# Patient Record
Sex: MALE | URBAN - METROPOLITAN AREA
[De-identification: names, ages, dates, MRNs, and addresses within clinical notes are randomized per-mention and may not be internally consistent; named-entity substitution may affect disease eponyms.]

---

## 2022-09-20 ENCOUNTER — TELEPHONE ENCOUNTER (OUTPATIENT)
Dept: URBAN - METROPOLITAN AREA CLINIC 37 | Facility: CLINIC | Age: 82
End: 2022-09-20

## 2022-09-21 ENCOUNTER — TELEPHONE ENCOUNTER (OUTPATIENT)
Dept: URBAN - METROPOLITAN AREA SURGERY CENTER 8 | Facility: SURGERY CENTER | Age: 82
End: 2022-09-21

## 2022-09-22 ENCOUNTER — OFFICE VISIT (OUTPATIENT)
Dept: URBAN - METROPOLITAN AREA CLINIC 37 | Facility: CLINIC | Age: 82
End: 2022-09-22
Payer: COMMERCIAL

## 2022-09-22 ENCOUNTER — TELEPHONE ENCOUNTER (OUTPATIENT)
Dept: URBAN - METROPOLITAN AREA CLINIC 35 | Facility: CLINIC | Age: 82
End: 2022-09-22

## 2022-09-22 ENCOUNTER — LAB OUTSIDE AN ENCOUNTER (OUTPATIENT)
Dept: URBAN - METROPOLITAN AREA CLINIC 37 | Facility: CLINIC | Age: 82
End: 2022-09-22

## 2022-09-22 VITALS
WEIGHT: 164.6 LBS | DIASTOLIC BLOOD PRESSURE: 70 MMHG | HEIGHT: 65 IN | HEART RATE: 102 BPM | SYSTOLIC BLOOD PRESSURE: 122 MMHG | BODY MASS INDEX: 27.42 KG/M2 | OXYGEN SATURATION: 94 %

## 2022-09-22 DIAGNOSIS — R13.19 ESOPHAGEAL DYSPHAGIA: ICD-10-CM

## 2022-09-22 DIAGNOSIS — C15.9 PRIMARY ESOPHAGEAL SQUAMOUS CELL CARCINOMA: ICD-10-CM

## 2022-09-22 DIAGNOSIS — E44.0 MODERATE PROTEIN-CALORIE MALNUTRITION: ICD-10-CM

## 2022-09-22 PROCEDURE — 99204 OFFICE O/P NEW MOD 45 MIN: CPT | Performed by: INTERNAL MEDICINE

## 2022-09-22 RX ORDER — SERTRALINE 50 MG/1
1 TABLET TABLET, FILM COATED ORAL ONCE A DAY
Qty: 30 | Status: ACTIVE | COMMUNITY
Start: 2022-09-22

## 2022-09-22 RX ORDER — CALCIUM CARBONATE 500(1250)
1 TABLET WITH MEALS TABLET ORAL TWICE A DAY
Qty: 60 | Status: ACTIVE | COMMUNITY
Start: 2022-09-22

## 2022-09-22 RX ORDER — CODEINE PHOSPHATE AND GUAIFENESIN 10; 100 MG/5ML; MG/5ML
TAKE 5 ML BY MOUTH EVERY FOUR TO SIX HOURS AS NEEDED SOLUTION ORAL
Qty: 160 UNSPECIFIED | Refills: 0 | Status: ACTIVE | COMMUNITY

## 2022-09-22 RX ORDER — ZOLPIDEM TARTRATE 5 MG/1
TAKE ONE TABLET BY MOUTH AT BEDTIME ON NIGHT OF SLEEP STUDY DURING ELECTRODE APPLICATION; CAN REPEAT A SECOND DOSE 40 MINUTES LATER TABLET, COATED ORAL
Qty: 30 UNSPECIFIED | Refills: 0 | Status: ACTIVE | COMMUNITY

## 2022-09-22 RX ORDER — LEVOTHYROXINE SODIUM 50 UG/1
1 TABLET IN THE MORNING ON AN EMPTY STOMACH TABLET ORAL ONCE A DAY
Qty: 30 | Status: ACTIVE | COMMUNITY
Start: 2022-09-22

## 2022-09-22 RX ORDER — OMEPRAZOLE 40 MG/1
CAPSULE, DELAYED RELEASE ORAL
Qty: 90 CAPSULE | Status: ACTIVE | COMMUNITY

## 2022-09-22 RX ORDER — ATORVASTATIN CALCIUM 10 MG/1
TAKE ONE TABLET BY MOUTH ONE TIME DAILY TABLET, FILM COATED ORAL
Qty: 90 UNSPECIFIED | Refills: 0 | Status: ACTIVE | COMMUNITY

## 2022-09-22 RX ORDER — ASCORBIC ACID 1000 MG
1 TABLET TABLET ORAL ONCE A DAY
Qty: 30 | Status: ACTIVE | COMMUNITY
Start: 2022-09-22

## 2022-09-22 RX ORDER — LACTOSE-REDUCED FOOD/FIBER 0.07 G-1.5
300 ML LIQUID (ML) ORAL
Qty: 27000 MILLILITER | Refills: 11 | OUTPATIENT
Start: 2022-09-22

## 2022-09-22 NOTE — HPI-DYSPHAGIA
Patient is here for an initial consultation due to dysphagia. Patient had a history of proximal esophageal and esophagogastric junction cancer, stage II, which was diagnosed after EGD was performed by Dr. Elena Diaz in 12/16/2020 . EGD revealed a circumferential tumor 19 cm  from the incisors. 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 Patient underwent chemo (weekly Carbo/Taxol) and RT with Dr. Brewer completed: C6 of chemo on 03/04/2021 and RT completed on 03/18/2021.  Repeat EGD don with Dr. Diaz on 07/12/2021 was negative for residual disease but proximal esophageal stricture was found. Repeat EGD done 03/10/2022 showed esophageal strictures, dilated and bxx showed recurrence of refractory SCC at 15 cm from the incisors.  Then had weekly Carbo/Taxol x 5 week and completed RT in 05/31/2022.  Patient has a PET CT done 09/15/2022, which showed interval enlargement and increasingly hypermetabolic mass of the proximal esophagus suggesting disease progression/refractory SCC.  Mass may reflect paraesophageal metastatic lymph node or mucosal lesion of the esophagus. Dr. Pieter Lopes (Onc) spoke to me yesterday and request a palliative peg-tube placement for nutrition and meds  Patient presents here today with daughter, per daughter, patient could not eat any solid and has difficulty drinking liquid, which caused choking and regurgitation while swallowing, so he was only able to swallow very slowly only with liquid. Denies pain when swallowing, abdominal pain, fever, nausea or vomiting

## 2022-09-30 ENCOUNTER — CLAIMS CREATED FROM THE CLAIM WINDOW (OUTPATIENT)
Dept: URBAN - METROPOLITAN AREA CLINIC 4 | Facility: CLINIC | Age: 82
End: 2022-09-30
Payer: COMMERCIAL

## 2022-09-30 ENCOUNTER — OFFICE VISIT (OUTPATIENT)
Dept: URBAN - METROPOLITAN AREA SURGERY CENTER 8 | Facility: SURGERY CENTER | Age: 82
End: 2022-09-30
Payer: COMMERCIAL

## 2022-09-30 ENCOUNTER — TELEPHONE ENCOUNTER (OUTPATIENT)
Dept: URBAN - METROPOLITAN AREA CLINIC 37 | Facility: CLINIC | Age: 82
End: 2022-09-30

## 2022-09-30 DIAGNOSIS — R13.19 CERVICAL DYSPHAGIA: ICD-10-CM

## 2022-09-30 DIAGNOSIS — K22.2 ACQUIRED ESOPHAGEAL RING: ICD-10-CM

## 2022-09-30 DIAGNOSIS — K21.9 GASTRO-ESOPHAGEAL REFLUX DISEASE WITHOUT ESOPHAGITIS: ICD-10-CM

## 2022-09-30 DIAGNOSIS — K21.9 ACID REFLUX: ICD-10-CM

## 2022-09-30 PROCEDURE — 43249 ESOPH EGD DILATION <30 MM: CPT | Performed by: INTERNAL MEDICINE

## 2022-09-30 PROCEDURE — 43239 EGD BIOPSY SINGLE/MULTIPLE: CPT | Performed by: INTERNAL MEDICINE

## 2022-09-30 PROCEDURE — 88312 SPECIAL STAINS GROUP 1: CPT | Performed by: PATHOLOGY

## 2022-09-30 PROCEDURE — 88305 TISSUE EXAM BY PATHOLOGIST: CPT | Performed by: PATHOLOGY

## 2022-09-30 PROCEDURE — G8907 PT DOC NO EVENTS ON DISCHARG: HCPCS | Performed by: INTERNAL MEDICINE

## 2022-09-30 RX ORDER — ZOLPIDEM TARTRATE 5 MG/1
TAKE ONE TABLET BY MOUTH AT BEDTIME ON NIGHT OF SLEEP STUDY DURING ELECTRODE APPLICATION; CAN REPEAT A SECOND DOSE 40 MINUTES LATER TABLET, COATED ORAL
Qty: 30 UNSPECIFIED | Refills: 0 | Status: ACTIVE | COMMUNITY

## 2022-09-30 RX ORDER — CALCIUM CARBONATE 500(1250)
1 TABLET WITH MEALS TABLET ORAL TWICE A DAY
Qty: 60 | Status: ACTIVE | COMMUNITY
Start: 2022-09-22

## 2022-09-30 RX ORDER — LACTOSE-REDUCED FOOD/FIBER 0.07 G-1.5
300 ML LIQUID (ML) ORAL
Qty: 27000 MILLILITER | Refills: 11 | Status: ACTIVE | COMMUNITY
Start: 2022-09-22

## 2022-09-30 RX ORDER — LEVOTHYROXINE SODIUM 50 UG/1
1 TABLET IN THE MORNING ON AN EMPTY STOMACH TABLET ORAL ONCE A DAY
Qty: 30 | Status: ACTIVE | COMMUNITY
Start: 2022-09-22

## 2022-09-30 RX ORDER — OMEPRAZOLE 40 MG/1
CAPSULE, DELAYED RELEASE ORAL
Qty: 90 CAPSULE | Status: ACTIVE | COMMUNITY

## 2022-09-30 RX ORDER — CODEINE PHOSPHATE AND GUAIFENESIN 10; 100 MG/5ML; MG/5ML
TAKE 5 ML BY MOUTH EVERY FOUR TO SIX HOURS AS NEEDED SOLUTION ORAL
Qty: 160 UNSPECIFIED | Refills: 0 | Status: ACTIVE | COMMUNITY

## 2022-09-30 RX ORDER — ASCORBIC ACID 1000 MG
1 TABLET TABLET ORAL ONCE A DAY
Qty: 30 | Status: ACTIVE | COMMUNITY
Start: 2022-09-22

## 2022-09-30 RX ORDER — SERTRALINE 50 MG/1
1 TABLET TABLET, FILM COATED ORAL ONCE A DAY
Qty: 30 | Status: ACTIVE | COMMUNITY
Start: 2022-09-22

## 2022-09-30 RX ORDER — ATORVASTATIN CALCIUM 10 MG/1
TAKE ONE TABLET BY MOUTH ONE TIME DAILY TABLET, FILM COATED ORAL
Qty: 90 UNSPECIFIED | Refills: 0 | Status: ACTIVE | COMMUNITY

## 2022-10-14 ENCOUNTER — TELEPHONE ENCOUNTER (OUTPATIENT)
Dept: URBAN - METROPOLITAN AREA CLINIC 35 | Facility: CLINIC | Age: 82
End: 2022-10-14

## 2022-10-17 ENCOUNTER — OFFICE VISIT (OUTPATIENT)
Dept: URBAN - METROPOLITAN AREA MEDICAL CENTER 10 | Facility: MEDICAL CENTER | Age: 82
End: 2022-10-17
Payer: COMMERCIAL

## 2022-10-17 DIAGNOSIS — K22.2 ACQUIRED ESOPHAGEAL RING: ICD-10-CM

## 2022-10-17 DIAGNOSIS — D00.1 ADENOCARCINOMA IN SITU OF ESOPHAGUS: ICD-10-CM

## 2022-10-17 PROCEDURE — 43239 EGD BIOPSY SINGLE/MULTIPLE: CPT | Performed by: INTERNAL MEDICINE

## 2022-10-17 PROCEDURE — 43249 ESOPH EGD DILATION <30 MM: CPT | Performed by: INTERNAL MEDICINE

## 2022-10-17 RX ORDER — SERTRALINE 50 MG/1
1 TABLET TABLET, FILM COATED ORAL ONCE A DAY
Qty: 30 | Status: ACTIVE | COMMUNITY
Start: 2022-09-22

## 2022-10-17 RX ORDER — OMEPRAZOLE 40 MG/1
CAPSULE, DELAYED RELEASE ORAL
Qty: 90 CAPSULE | Status: ACTIVE | COMMUNITY

## 2022-10-17 RX ORDER — ATORVASTATIN CALCIUM 10 MG/1
TAKE ONE TABLET BY MOUTH ONE TIME DAILY TABLET, FILM COATED ORAL
Qty: 90 UNSPECIFIED | Refills: 0 | Status: ACTIVE | COMMUNITY

## 2022-10-17 RX ORDER — CALCIUM CARBONATE 500(1250)
1 TABLET WITH MEALS TABLET ORAL TWICE A DAY
Qty: 60 | Status: ACTIVE | COMMUNITY
Start: 2022-09-22

## 2022-10-17 RX ORDER — LEVOTHYROXINE SODIUM 50 UG/1
1 TABLET IN THE MORNING ON AN EMPTY STOMACH TABLET ORAL ONCE A DAY
Qty: 30 | Status: ACTIVE | COMMUNITY
Start: 2022-09-22

## 2022-10-17 RX ORDER — LACTOSE-REDUCED FOOD/FIBER 0.07 G-1.5
300 ML LIQUID (ML) ORAL
Qty: 27000 MILLILITER | Refills: 11 | Status: ACTIVE | COMMUNITY
Start: 2022-09-22

## 2022-10-17 RX ORDER — ZOLPIDEM TARTRATE 5 MG/1
TAKE ONE TABLET BY MOUTH AT BEDTIME ON NIGHT OF SLEEP STUDY DURING ELECTRODE APPLICATION; CAN REPEAT A SECOND DOSE 40 MINUTES LATER TABLET, COATED ORAL
Qty: 30 UNSPECIFIED | Refills: 0 | Status: ACTIVE | COMMUNITY

## 2022-10-17 RX ORDER — ASCORBIC ACID 1000 MG
1 TABLET TABLET ORAL ONCE A DAY
Qty: 30 | Status: ACTIVE | COMMUNITY
Start: 2022-09-22

## 2022-10-17 RX ORDER — CODEINE PHOSPHATE AND GUAIFENESIN 10; 100 MG/5ML; MG/5ML
TAKE 5 ML BY MOUTH EVERY FOUR TO SIX HOURS AS NEEDED SOLUTION ORAL
Qty: 160 UNSPECIFIED | Refills: 0 | Status: ACTIVE | COMMUNITY

## 2022-10-20 ENCOUNTER — LAB OUTSIDE AN ENCOUNTER (OUTPATIENT)
Dept: URBAN - METROPOLITAN AREA CLINIC 37 | Facility: CLINIC | Age: 82
End: 2022-10-20

## 2022-10-20 ENCOUNTER — OFFICE VISIT (OUTPATIENT)
Dept: URBAN - METROPOLITAN AREA CLINIC 37 | Facility: CLINIC | Age: 82
End: 2022-10-20
Payer: COMMERCIAL

## 2022-10-20 VITALS
SYSTOLIC BLOOD PRESSURE: 120 MMHG | WEIGHT: 161 LBS | BODY MASS INDEX: 26.82 KG/M2 | OXYGEN SATURATION: 95 % | HEIGHT: 65 IN | HEART RATE: 98 BPM | DIASTOLIC BLOOD PRESSURE: 76 MMHG

## 2022-10-20 DIAGNOSIS — K59.09 CHRONIC CONSTIPATION: ICD-10-CM

## 2022-10-20 DIAGNOSIS — C15.9 PRIMARY ESOPHAGEAL SQUAMOUS CELL CARCINOMA: ICD-10-CM

## 2022-10-20 DIAGNOSIS — R13.19 ESOPHAGEAL DYSPHAGIA: ICD-10-CM

## 2022-10-20 DIAGNOSIS — E44.0 MODERATE PROTEIN-CALORIE MALNUTRITION: ICD-10-CM

## 2022-10-20 PROCEDURE — 99214 OFFICE O/P EST MOD 30 MIN: CPT | Performed by: INTERNAL MEDICINE

## 2022-10-20 RX ORDER — ZOLPIDEM TARTRATE 5 MG/1
TAKE ONE TABLET BY MOUTH AT BEDTIME ON NIGHT OF SLEEP STUDY DURING ELECTRODE APPLICATION; CAN REPEAT A SECOND DOSE 40 MINUTES LATER TABLET, COATED ORAL
Qty: 30 UNSPECIFIED | Refills: 0 | Status: ACTIVE | COMMUNITY

## 2022-10-20 RX ORDER — LACTULOSE 10 G/15ML
30 ML SOLUTION ORAL
Qty: 2700 MILLILITER | Refills: 5 | OUTPATIENT
Start: 2022-10-20 | End: 2023-04-18

## 2022-10-20 RX ORDER — ATORVASTATIN CALCIUM 10 MG/1
TAKE ONE TABLET BY MOUTH ONE TIME DAILY TABLET, FILM COATED ORAL
Qty: 90 UNSPECIFIED | Refills: 0 | Status: ACTIVE | COMMUNITY

## 2022-10-20 RX ORDER — ASCORBIC ACID 1000 MG
1 TABLET TABLET ORAL ONCE A DAY
Qty: 30 | Status: ON HOLD | COMMUNITY
Start: 2022-09-22

## 2022-10-20 RX ORDER — LACTOSE-REDUCED FOOD/FIBER 0.07 G-1.5
300 ML LIQUID (ML) ORAL
Qty: 27000 MILLILITER | Refills: 11 | Status: ON HOLD | COMMUNITY
Start: 2022-09-22

## 2022-10-20 RX ORDER — SERTRALINE 50 MG/1
1 TABLET TABLET, FILM COATED ORAL ONCE A DAY
Qty: 30 | Status: ACTIVE | COMMUNITY
Start: 2022-09-22

## 2022-10-20 RX ORDER — CALCIUM CARBONATE 500(1250)
1 TABLET WITH MEALS TABLET ORAL TWICE A DAY
Qty: 60 | Status: ON HOLD | COMMUNITY
Start: 2022-09-22

## 2022-10-20 RX ORDER — OMEPRAZOLE 40 MG/1
CAPSULE, DELAYED RELEASE ORAL
Qty: 90 CAPSULE | Status: ACTIVE | COMMUNITY

## 2022-10-20 RX ORDER — LEVOTHYROXINE SODIUM 50 UG/1
1 TABLET IN THE MORNING ON AN EMPTY STOMACH TABLET ORAL ONCE A DAY
Qty: 30 | Status: ACTIVE | COMMUNITY
Start: 2022-09-22

## 2022-10-20 RX ORDER — CODEINE PHOSPHATE AND GUAIFENESIN 10; 100 MG/5ML; MG/5ML
TAKE 5 ML BY MOUTH EVERY FOUR TO SIX HOURS AS NEEDED SOLUTION ORAL
Qty: 160 UNSPECIFIED | Refills: 0 | Status: ON HOLD | COMMUNITY

## 2022-10-20 RX ORDER — LACTOSE-REDUCED FOOD/FIBER 0.07 G-1.5
300 ML LIQUID (ML) ORAL
Qty: 27000 MILLILITER | Refills: 11 | OUTPATIENT

## 2022-10-31 ENCOUNTER — OFFICE VISIT (OUTPATIENT)
Dept: URBAN - METROPOLITAN AREA MEDICAL CENTER 10 | Facility: MEDICAL CENTER | Age: 82
End: 2022-10-31
Payer: COMMERCIAL

## 2022-10-31 DIAGNOSIS — K22.2 ACQUIRED ESOPHAGEAL RING: ICD-10-CM

## 2022-10-31 DIAGNOSIS — C15.9 ADENOCARCINOMA OF ESOPHAGUS: ICD-10-CM

## 2022-10-31 PROCEDURE — 43249 ESOPH EGD DILATION <30 MM: CPT | Performed by: INTERNAL MEDICINE

## 2022-10-31 PROCEDURE — 43246 EGD PLACE GASTROSTOMY TUBE: CPT | Performed by: INTERNAL MEDICINE

## 2022-11-03 ENCOUNTER — OFFICE VISIT (OUTPATIENT)
Dept: URBAN - METROPOLITAN AREA CLINIC 37 | Facility: CLINIC | Age: 82
End: 2022-11-03

## 2022-11-17 ENCOUNTER — OFFICE VISIT (OUTPATIENT)
Dept: URBAN - METROPOLITAN AREA CLINIC 37 | Facility: CLINIC | Age: 82
End: 2022-11-17
Payer: COMMERCIAL

## 2022-11-17 ENCOUNTER — LAB OUTSIDE AN ENCOUNTER (OUTPATIENT)
Dept: URBAN - METROPOLITAN AREA CLINIC 37 | Facility: CLINIC | Age: 82
End: 2022-11-17

## 2022-11-17 VITALS
HEIGHT: 65 IN | OXYGEN SATURATION: 96 % | SYSTOLIC BLOOD PRESSURE: 117 MMHG | WEIGHT: 159 LBS | BODY MASS INDEX: 26.49 KG/M2 | DIASTOLIC BLOOD PRESSURE: 80 MMHG | HEART RATE: 80 BPM

## 2022-11-17 DIAGNOSIS — E44.0 MODERATE PROTEIN-CALORIE MALNUTRITION: ICD-10-CM

## 2022-11-17 DIAGNOSIS — C15.9 PRIMARY ESOPHAGEAL SQUAMOUS CELL CARCINOMA: ICD-10-CM

## 2022-11-17 DIAGNOSIS — R13.19 ESOPHAGEAL DYSPHAGIA: ICD-10-CM

## 2022-11-17 DIAGNOSIS — K59.09 CHRONIC CONSTIPATION: ICD-10-CM

## 2022-11-17 DIAGNOSIS — K22.2 ESOPHAGEAL STRICTURE: ICD-10-CM

## 2022-11-17 PROBLEM — 276804009: Status: ACTIVE | Noted: 2022-09-21

## 2022-11-17 PROBLEM — 236069009: Status: ACTIVE | Noted: 2022-10-20

## 2022-11-17 PROCEDURE — 99214 OFFICE O/P EST MOD 30 MIN: CPT | Performed by: INTERNAL MEDICINE

## 2022-11-17 RX ORDER — SERTRALINE 50 MG/1
1 TABLET TABLET, FILM COATED ORAL ONCE A DAY
Qty: 30 | Status: ACTIVE | COMMUNITY
Start: 2022-09-22

## 2022-11-17 RX ORDER — ASCORBIC ACID 1000 MG
1 TABLET TABLET ORAL ONCE A DAY
Qty: 30 | Status: ON HOLD | COMMUNITY
Start: 2022-09-22

## 2022-11-17 RX ORDER — LACTULOSE 10 G/15ML
30 ML SOLUTION ORAL TWICE DAILY
Qty: 1800 MILLILITER | Refills: 3

## 2022-11-17 RX ORDER — LACTOSE-REDUCED FOOD/FIBER 0.07 G-1.5
300 ML LIQUID (ML) ORAL
Qty: 27000 MILLILITER | Refills: 11

## 2022-11-17 RX ORDER — LACTULOSE 10 G/15ML
30 ML SOLUTION ORAL
Qty: 2700 MILLILITER | Refills: 5 | Status: ACTIVE | COMMUNITY
Start: 2022-10-20 | End: 2023-04-18

## 2022-11-17 RX ORDER — CALCIUM CARBONATE 500(1250)
1 TABLET WITH MEALS TABLET ORAL TWICE A DAY
Qty: 60 | Status: ON HOLD | COMMUNITY
Start: 2022-09-22

## 2022-11-17 RX ORDER — CODEINE PHOSPHATE AND GUAIFENESIN 10; 100 MG/5ML; MG/5ML
TAKE 5 ML BY MOUTH EVERY FOUR TO SIX HOURS AS NEEDED SOLUTION ORAL
Qty: 160 UNSPECIFIED | Refills: 0 | Status: ON HOLD | COMMUNITY

## 2022-11-17 RX ORDER — ATORVASTATIN CALCIUM 10 MG/1
TAKE ONE TABLET BY MOUTH ONE TIME DAILY TABLET, FILM COATED ORAL
Qty: 90 UNSPECIFIED | Refills: 0 | Status: ACTIVE | COMMUNITY

## 2022-11-17 RX ORDER — ZOLPIDEM TARTRATE 5 MG/1
TAKE ONE TABLET BY MOUTH AT BEDTIME ON NIGHT OF SLEEP STUDY DURING ELECTRODE APPLICATION; CAN REPEAT A SECOND DOSE 40 MINUTES LATER TABLET, COATED ORAL
Qty: 30 UNSPECIFIED | Refills: 0 | Status: ACTIVE | COMMUNITY

## 2022-11-17 RX ORDER — LEVOTHYROXINE SODIUM 50 UG/1
1 TABLET IN THE MORNING ON AN EMPTY STOMACH TABLET ORAL ONCE A DAY
Qty: 30 | Status: ACTIVE | COMMUNITY
Start: 2022-09-22

## 2022-11-17 RX ORDER — LACTOSE-REDUCED FOOD/FIBER 0.07 G-1.5
300 ML LIQUID (ML) ORAL
Qty: 27000 MILLILITER | Refills: 11 | Status: ON HOLD | COMMUNITY

## 2022-11-17 RX ORDER — OMEPRAZOLE 40 MG/1
CAPSULE, DELAYED RELEASE ORAL
Qty: 90 CAPSULE | Status: ACTIVE | COMMUNITY

## 2022-11-17 NOTE — HPI-CONSTIPATION
Patient is here for routine follow up due to constiaption. Last OV was 4 weeks ago Patient was advised to take Lactulose 20 GM/30 Ml, 30 mL TID but he is only taking Lactulose once a day  Current BM:  1 BM daily, type 4 stools on BS scale Patient denies blood in stool, recal bleeding, or melena

## 2022-12-06 ENCOUNTER — TELEPHONE ENCOUNTER (OUTPATIENT)
Dept: URBAN - METROPOLITAN AREA CLINIC 35 | Facility: CLINIC | Age: 82
End: 2022-12-06

## 2023-01-06 PROBLEM — 63305008 STRICTURE OF ESOPHAGUS: Status: ACTIVE | Noted: 2023-01-04

## 2023-01-09 ENCOUNTER — OFFICE VISIT (OUTPATIENT)
Dept: URBAN - METROPOLITAN AREA MEDICAL CENTER 10 | Facility: MEDICAL CENTER | Age: 83
End: 2023-01-09

## 2023-01-10 PROBLEM — 276803003 ADENOCARCINOMA OF ESOPHAGUS: Status: ACTIVE | Noted: 2023-01-10

## 2023-01-10 PROBLEM — 40739000 DYSPHAGIA: Status: ACTIVE | Noted: 2023-01-10

## 2023-01-23 ENCOUNTER — OFFICE VISIT (OUTPATIENT)
Dept: URBAN - METROPOLITAN AREA MEDICAL CENTER 10 | Facility: MEDICAL CENTER | Age: 83
End: 2023-01-23

## 2023-01-23 ENCOUNTER — OFFICE VISIT (OUTPATIENT)
Dept: URBAN - METROPOLITAN AREA MEDICAL CENTER 10 | Facility: MEDICAL CENTER | Age: 83
End: 2023-01-23
Payer: COMMERCIAL

## 2023-01-23 DIAGNOSIS — C15.9 ADENOCARCINOMA OF ESOPHAGUS: ICD-10-CM

## 2023-01-23 DIAGNOSIS — K22.2 ACQUIRED ESOPHAGEAL RING: ICD-10-CM

## 2023-01-23 PROCEDURE — 43249 ESOPH EGD DILATION <30 MM: CPT | Performed by: INTERNAL MEDICINE

## 2023-01-23 RX ORDER — ATORVASTATIN CALCIUM 10 MG/1
TAKE ONE TABLET BY MOUTH ONE TIME DAILY TABLET, FILM COATED ORAL
Qty: 90 UNSPECIFIED | Refills: 0 | Status: ACTIVE | COMMUNITY

## 2023-01-23 RX ORDER — ZOLPIDEM TARTRATE 5 MG/1
TAKE ONE TABLET BY MOUTH AT BEDTIME ON NIGHT OF SLEEP STUDY DURING ELECTRODE APPLICATION; CAN REPEAT A SECOND DOSE 40 MINUTES LATER TABLET, COATED ORAL
Qty: 30 UNSPECIFIED | Refills: 0 | Status: ACTIVE | COMMUNITY

## 2023-01-23 RX ORDER — SERTRALINE 50 MG/1
1 TABLET TABLET, FILM COATED ORAL ONCE A DAY
Qty: 30 | Status: ACTIVE | COMMUNITY
Start: 2022-09-22

## 2023-01-23 RX ORDER — CODEINE PHOSPHATE AND GUAIFENESIN 10; 100 MG/5ML; MG/5ML
TAKE 5 ML BY MOUTH EVERY FOUR TO SIX HOURS AS NEEDED SOLUTION ORAL
Qty: 160 UNSPECIFIED | Refills: 0 | Status: ON HOLD | COMMUNITY

## 2023-01-23 RX ORDER — LEVOTHYROXINE SODIUM 50 UG/1
1 TABLET IN THE MORNING ON AN EMPTY STOMACH TABLET ORAL ONCE A DAY
Qty: 30 | Status: ACTIVE | COMMUNITY
Start: 2022-09-22

## 2023-01-23 RX ORDER — ASCORBIC ACID 1000 MG
1 TABLET TABLET ORAL ONCE A DAY
Qty: 30 | Status: ON HOLD | COMMUNITY
Start: 2022-09-22

## 2023-01-23 RX ORDER — OMEPRAZOLE 40 MG/1
CAPSULE, DELAYED RELEASE ORAL
Qty: 90 CAPSULE | Status: ACTIVE | COMMUNITY

## 2023-01-23 RX ORDER — LACTULOSE 10 G/15ML
30 ML SOLUTION ORAL TWICE DAILY
Qty: 1800 MILLILITER | Refills: 3 | Status: ACTIVE | COMMUNITY

## 2023-01-23 RX ORDER — LACTOSE-REDUCED FOOD/FIBER 0.07 G-1.5
300 ML LIQUID (ML) ORAL
Qty: 27000 MILLILITER | Refills: 11 | Status: ACTIVE | COMMUNITY

## 2023-01-23 RX ORDER — CALCIUM CARBONATE 500(1250)
1 TABLET WITH MEALS TABLET ORAL TWICE A DAY
Qty: 60 | Status: ON HOLD | COMMUNITY
Start: 2022-09-22

## 2023-01-30 ENCOUNTER — OFFICE VISIT (OUTPATIENT)
Dept: URBAN - METROPOLITAN AREA MEDICAL CENTER 10 | Facility: MEDICAL CENTER | Age: 83
End: 2023-01-30

## 2023-02-06 ENCOUNTER — OFFICE VISIT (OUTPATIENT)
Dept: URBAN - METROPOLITAN AREA MEDICAL CENTER 10 | Facility: MEDICAL CENTER | Age: 83
End: 2023-02-06

## 2023-02-06 ENCOUNTER — OFFICE VISIT (OUTPATIENT)
Dept: URBAN - METROPOLITAN AREA MEDICAL CENTER 10 | Facility: MEDICAL CENTER | Age: 83
End: 2023-02-06
Payer: COMMERCIAL

## 2023-02-06 DIAGNOSIS — K22.2 ACQUIRED ESOPHAGEAL RING: ICD-10-CM

## 2023-02-06 DIAGNOSIS — C15.9 ADENOCARCINOMA OF ESOPHAGUS: ICD-10-CM

## 2023-02-06 PROCEDURE — 43249 ESOPH EGD DILATION <30 MM: CPT | Performed by: INTERNAL MEDICINE

## 2023-02-06 RX ORDER — ASCORBIC ACID 1000 MG
1 TABLET TABLET ORAL ONCE A DAY
Qty: 30 | Status: ON HOLD | COMMUNITY
Start: 2022-09-22

## 2023-02-06 RX ORDER — OMEPRAZOLE 40 MG/1
CAPSULE, DELAYED RELEASE ORAL
Qty: 90 CAPSULE | Status: ACTIVE | COMMUNITY

## 2023-02-06 RX ORDER — CODEINE PHOSPHATE AND GUAIFENESIN 10; 100 MG/5ML; MG/5ML
TAKE 5 ML BY MOUTH EVERY FOUR TO SIX HOURS AS NEEDED SOLUTION ORAL
Qty: 160 UNSPECIFIED | Refills: 0 | Status: ON HOLD | COMMUNITY

## 2023-02-06 RX ORDER — CALCIUM CARBONATE 500(1250)
1 TABLET WITH MEALS TABLET ORAL TWICE A DAY
Qty: 60 | Status: ON HOLD | COMMUNITY
Start: 2022-09-22

## 2023-02-06 RX ORDER — LACTULOSE 10 G/15ML
30 ML SOLUTION ORAL TWICE DAILY
Qty: 1800 MILLILITER | Refills: 3 | Status: ACTIVE | COMMUNITY

## 2023-02-06 RX ORDER — LEVOTHYROXINE SODIUM 50 UG/1
1 TABLET IN THE MORNING ON AN EMPTY STOMACH TABLET ORAL ONCE A DAY
Qty: 30 | Status: ACTIVE | COMMUNITY
Start: 2022-09-22

## 2023-02-06 RX ORDER — ATORVASTATIN CALCIUM 10 MG/1
TAKE ONE TABLET BY MOUTH ONE TIME DAILY TABLET, FILM COATED ORAL
Qty: 90 UNSPECIFIED | Refills: 0 | Status: ACTIVE | COMMUNITY

## 2023-02-06 RX ORDER — ZOLPIDEM TARTRATE 5 MG/1
TAKE ONE TABLET BY MOUTH AT BEDTIME ON NIGHT OF SLEEP STUDY DURING ELECTRODE APPLICATION; CAN REPEAT A SECOND DOSE 40 MINUTES LATER TABLET, COATED ORAL
Qty: 30 UNSPECIFIED | Refills: 0 | Status: ACTIVE | COMMUNITY

## 2023-02-06 RX ORDER — SERTRALINE 50 MG/1
1 TABLET TABLET, FILM COATED ORAL ONCE A DAY
Qty: 30 | Status: ACTIVE | COMMUNITY
Start: 2022-09-22

## 2023-02-06 RX ORDER — LACTOSE-REDUCED FOOD/FIBER 0.07 G-1.5
300 ML LIQUID (ML) ORAL
Qty: 27000 MILLILITER | Refills: 11 | Status: ACTIVE | COMMUNITY

## 2023-02-13 ENCOUNTER — OFFICE VISIT (OUTPATIENT)
Dept: URBAN - METROPOLITAN AREA MEDICAL CENTER 10 | Facility: MEDICAL CENTER | Age: 83
End: 2023-02-13

## 2023-02-17 PROBLEM — 40890009: Status: ACTIVE | Noted: 2022-09-21

## 2023-02-17 PROBLEM — 63305008: Status: ACTIVE | Noted: 2022-11-17

## 2023-02-23 ENCOUNTER — OFFICE VISIT (OUTPATIENT)
Dept: URBAN - METROPOLITAN AREA CLINIC 37 | Facility: CLINIC | Age: 83
End: 2023-02-23

## 2023-02-27 ENCOUNTER — OFFICE VISIT (OUTPATIENT)
Dept: URBAN - METROPOLITAN AREA MEDICAL CENTER 10 | Facility: MEDICAL CENTER | Age: 83
End: 2023-02-27

## 2023-03-06 ENCOUNTER — TELEPHONE ENCOUNTER (OUTPATIENT)
Dept: URBAN - METROPOLITAN AREA CLINIC 37 | Facility: CLINIC | Age: 83
End: 2023-03-06

## 2023-03-06 ENCOUNTER — OFFICE VISIT (OUTPATIENT)
Dept: URBAN - METROPOLITAN AREA MEDICAL CENTER 10 | Facility: MEDICAL CENTER | Age: 83
End: 2023-03-06
Payer: COMMERCIAL

## 2023-03-06 DIAGNOSIS — Z93.1 FEEDING BY G-TUBE: ICD-10-CM

## 2023-03-06 DIAGNOSIS — K22.2 ACQUIRED ESOPHAGEAL RING: ICD-10-CM

## 2023-03-06 PROBLEM — 190606006: Status: ACTIVE | Noted: 2022-09-21

## 2023-03-06 PROCEDURE — 43249 ESOPH EGD DILATION <30 MM: CPT | Performed by: INTERNAL MEDICINE

## 2023-03-06 RX ORDER — LEVOTHYROXINE SODIUM 50 UG/1
1 TABLET IN THE MORNING ON AN EMPTY STOMACH TABLET ORAL ONCE A DAY
Qty: 30 | Status: ACTIVE | COMMUNITY
Start: 2022-09-22

## 2023-03-06 RX ORDER — LACTOSE-REDUCED FOOD/FIBER 0.07 G-1.5
300 ML LIQUID (ML) ORAL
Qty: 27000 MILLILITER | Refills: 11 | Status: ACTIVE | COMMUNITY

## 2023-03-06 RX ORDER — ASCORBIC ACID 1000 MG
1 TABLET TABLET ORAL ONCE A DAY
Qty: 30 | Status: ON HOLD | COMMUNITY
Start: 2022-09-22

## 2023-03-06 RX ORDER — CALCIUM CARBONATE 500(1250)
1 TABLET WITH MEALS TABLET ORAL TWICE A DAY
Qty: 60 | Status: ON HOLD | COMMUNITY
Start: 2022-09-22

## 2023-03-06 RX ORDER — SERTRALINE 50 MG/1
1 TABLET TABLET, FILM COATED ORAL ONCE A DAY
Qty: 30 | Status: ACTIVE | COMMUNITY
Start: 2022-09-22

## 2023-03-06 RX ORDER — OMEPRAZOLE 40 MG/1
CAPSULE, DELAYED RELEASE ORAL
Qty: 90 CAPSULE | Status: ACTIVE | COMMUNITY

## 2023-03-06 RX ORDER — ZOLPIDEM TARTRATE 5 MG/1
TAKE ONE TABLET BY MOUTH AT BEDTIME ON NIGHT OF SLEEP STUDY DURING ELECTRODE APPLICATION; CAN REPEAT A SECOND DOSE 40 MINUTES LATER TABLET, COATED ORAL
Qty: 30 UNSPECIFIED | Refills: 0 | Status: ACTIVE | COMMUNITY

## 2023-03-06 RX ORDER — LACTOSE-REDUCED FOOD/FIBER 0.07 G-1.5
150 ML LIQUID (ML) ORAL
Qty: 150 | Refills: 6 | OUTPATIENT
Start: 2023-03-06

## 2023-03-06 RX ORDER — CODEINE PHOSPHATE AND GUAIFENESIN 10; 100 MG/5ML; MG/5ML
TAKE 5 ML BY MOUTH EVERY FOUR TO SIX HOURS AS NEEDED SOLUTION ORAL
Qty: 160 UNSPECIFIED | Refills: 0 | Status: ON HOLD | COMMUNITY

## 2023-03-06 RX ORDER — LACTULOSE 10 G/15ML
30 ML SOLUTION ORAL TWICE DAILY
Qty: 1800 MILLILITER | Refills: 3 | Status: ACTIVE | COMMUNITY

## 2023-03-06 RX ORDER — ATORVASTATIN CALCIUM 10 MG/1
TAKE ONE TABLET BY MOUTH ONE TIME DAILY TABLET, FILM COATED ORAL
Qty: 90 UNSPECIFIED | Refills: 0 | Status: ACTIVE | COMMUNITY

## 2023-03-09 ENCOUNTER — OFFICE VISIT (OUTPATIENT)
Dept: URBAN - METROPOLITAN AREA CLINIC 37 | Facility: CLINIC | Age: 83
End: 2023-03-09

## 2023-03-30 ENCOUNTER — OFFICE VISIT (OUTPATIENT)
Dept: URBAN - METROPOLITAN AREA CLINIC 37 | Facility: CLINIC | Age: 83
End: 2023-03-30
Payer: COMMERCIAL

## 2023-03-30 VITALS
HEIGHT: 65 IN | DIASTOLIC BLOOD PRESSURE: 76 MMHG | HEART RATE: 86 BPM | SYSTOLIC BLOOD PRESSURE: 118 MMHG | OXYGEN SATURATION: 96 % | BODY MASS INDEX: 25.83 KG/M2 | WEIGHT: 155 LBS

## 2023-03-30 DIAGNOSIS — R13.19 ESOPHAGEAL DYSPHAGIA: ICD-10-CM

## 2023-03-30 DIAGNOSIS — E44.0 MODERATE PROTEIN-CALORIE MALNUTRITION: ICD-10-CM

## 2023-03-30 DIAGNOSIS — K22.2 ESOPHAGEAL STRICTURE: ICD-10-CM

## 2023-03-30 DIAGNOSIS — K59.09 CHRONIC CONSTIPATION: ICD-10-CM

## 2023-03-30 DIAGNOSIS — C15.9 PRIMARY ESOPHAGEAL SQUAMOUS CELL CARCINOMA: ICD-10-CM

## 2023-03-30 PROCEDURE — 99214 OFFICE O/P EST MOD 30 MIN: CPT | Performed by: INTERNAL MEDICINE

## 2023-03-30 RX ORDER — CODEINE PHOSPHATE AND GUAIFENESIN 10; 100 MG/5ML; MG/5ML
TAKE 5 ML BY MOUTH EVERY FOUR TO SIX HOURS AS NEEDED SOLUTION ORAL
Qty: 160 UNSPECIFIED | Refills: 0 | Status: ACTIVE | COMMUNITY

## 2023-03-30 RX ORDER — LACTULOSE 10 G/15ML
30 ML SOLUTION ORAL TWICE DAILY
Qty: 1800 MILLILITER | Refills: 3 | Status: ON HOLD | COMMUNITY

## 2023-03-30 RX ORDER — LACTOSE-REDUCED FOOD/FIBER 0.07 G-1.5
300 ML LIQUID (ML) ORAL
Qty: 27000 MILLILITER | Refills: 11 | OUTPATIENT

## 2023-03-30 RX ORDER — CALCIUM CARBONATE 500(1250)
1 TABLET WITH MEALS TABLET ORAL TWICE A DAY
Qty: 60 | Status: ON HOLD | COMMUNITY
Start: 2022-09-22

## 2023-03-30 RX ORDER — ASCORBIC ACID 1000 MG
1 TABLET TABLET ORAL ONCE A DAY
Qty: 30 | Status: ON HOLD | COMMUNITY
Start: 2022-09-22

## 2023-03-30 RX ORDER — PREDNISONE 5 MG/1
1 TABLET TABLET ORAL ONCE A DAY
Status: ACTIVE | COMMUNITY

## 2023-03-30 RX ORDER — LACTOSE-REDUCED FOOD/FIBER 0.07 G-1.5
250 ML LIQUID (ML) ORAL
Qty: 37500 MILLILITER | Refills: 5 | OUTPATIENT
Start: 2023-03-30

## 2023-03-30 RX ORDER — OMEPRAZOLE 40 MG/1
1 CAPSULE 30 MINUTES BEFORE MORNING MEAL CAPSULE, DELAYED RELEASE ORAL ONCE A DAY
Status: ACTIVE | COMMUNITY

## 2023-03-30 RX ORDER — ATORVASTATIN CALCIUM 10 MG/1
TAKE ONE TABLET BY MOUTH ONE TIME DAILY TABLET, FILM COATED ORAL
Qty: 90 UNSPECIFIED | Refills: 0 | Status: ON HOLD | COMMUNITY

## 2023-03-30 RX ORDER — LEVOTHYROXINE SODIUM 75 UG/1
1 TABLET IN THE MORNING ON AN EMPTY STOMACH TABLET ORAL ONCE A DAY
Qty: 30 | Status: ACTIVE | COMMUNITY
Start: 2022-09-22

## 2023-03-30 RX ORDER — LACTOSE-REDUCED FOOD/FIBER 0.07 G-1.5
150 ML LIQUID (ML) ORAL
Qty: 150 | Refills: 6 | Status: ACTIVE | COMMUNITY
Start: 2023-03-06

## 2023-03-30 RX ORDER — SERTRALINE 50 MG/1
1 TABLET TABLET, FILM COATED ORAL ONCE A DAY
Qty: 30 | Status: ACTIVE | COMMUNITY
Start: 2022-09-22

## 2023-03-30 RX ORDER — LACTULOSE 10 G/15ML
30 ML SOLUTION ORAL TWICE DAILY
Qty: 1800 MILLILITER | Refills: 3

## 2023-03-30 RX ORDER — ZOLPIDEM TARTRATE 5 MG/1
TAKE ONE TABLET BY MOUTH AT BEDTIME ON NIGHT OF SLEEP STUDY DURING ELECTRODE APPLICATION; CAN REPEAT A SECOND DOSE 40 MINUTES LATER TABLET, COATED ORAL
Qty: 30 UNSPECIFIED | Refills: 0 | Status: ON HOLD | COMMUNITY

## 2023-03-30 NOTE — HPI-CONSTIPATION
Patient is here for routine follow up due to constipation. Last OV was 4 months ago Patient was advised to take Lactulose 20 GM/30 Ml, 30 mL TID but stopped medication.  Current BM:  1 formed BM daily Patient denies blood in stool, recal bleeding, or melena

## 2023-03-30 NOTE — PHYSICAL EXAM GASTROINTESTINAL
Abdomen , soft, nontender, nondistended , no guarding or rigidity , no masses palpable , normal bowel sounds , Liver and Spleen , no hepatomegaly present , no hepatosplenomegaly , liver nontender , spleen not palpable + PEG tube site is clean and intact

## 2023-04-03 ENCOUNTER — TELEPHONE ENCOUNTER (OUTPATIENT)
Dept: URBAN - METROPOLITAN AREA CLINIC 36 | Facility: CLINIC | Age: 83
End: 2023-04-03

## 2023-04-03 RX ORDER — PREDNISONE 5 MG/1
1 TABLET TABLET ORAL ONCE A DAY
Status: ACTIVE | COMMUNITY

## 2023-04-03 RX ORDER — NUT.TX.IMPAIRED RENAL FXN,SOY 0.09G-2/ML
250 ML LIQUID (ML) ORAL
Qty: 375000 ML | Refills: 5 | OUTPATIENT
Start: 2023-04-04

## 2023-04-03 RX ORDER — CODEINE PHOSPHATE AND GUAIFENESIN 10; 100 MG/5ML; MG/5ML
TAKE 5 ML BY MOUTH EVERY FOUR TO SIX HOURS AS NEEDED SOLUTION ORAL
Qty: 160 UNSPECIFIED | Refills: 0 | Status: ACTIVE | COMMUNITY

## 2023-04-03 RX ORDER — LEVOTHYROXINE SODIUM 75 UG/1
1 TABLET IN THE MORNING ON AN EMPTY STOMACH TABLET ORAL ONCE A DAY
Qty: 30 | Status: ACTIVE | COMMUNITY
Start: 2022-09-22

## 2023-04-03 RX ORDER — ZOLPIDEM TARTRATE 5 MG/1
TAKE ONE TABLET BY MOUTH AT BEDTIME ON NIGHT OF SLEEP STUDY DURING ELECTRODE APPLICATION; CAN REPEAT A SECOND DOSE 40 MINUTES LATER TABLET, COATED ORAL
Qty: 30 UNSPECIFIED | Refills: 0 | Status: ON HOLD | COMMUNITY

## 2023-04-03 RX ORDER — SERTRALINE 50 MG/1
1 TABLET TABLET, FILM COATED ORAL ONCE A DAY
Qty: 30 | Status: ACTIVE | COMMUNITY
Start: 2022-09-22

## 2023-04-03 RX ORDER — LACTULOSE 10 G/15ML
30 ML SOLUTION ORAL TWICE DAILY
Qty: 1800 MILLILITER | Refills: 3 | Status: ACTIVE | COMMUNITY

## 2023-04-03 RX ORDER — LACTOSE-REDUCED FOOD/FIBER 0.07 G-1.5
150 ML LIQUID (ML) ORAL
Qty: 150 | Refills: 6 | Status: ACTIVE | COMMUNITY
Start: 2023-03-06

## 2023-04-03 RX ORDER — OMEPRAZOLE 40 MG/1
1 CAPSULE 30 MINUTES BEFORE MORNING MEAL CAPSULE, DELAYED RELEASE ORAL ONCE A DAY
Status: ACTIVE | COMMUNITY

## 2023-04-03 RX ORDER — ATORVASTATIN CALCIUM 10 MG/1
TAKE ONE TABLET BY MOUTH ONE TIME DAILY TABLET, FILM COATED ORAL
Qty: 90 UNSPECIFIED | Refills: 0 | Status: ON HOLD | COMMUNITY

## 2023-04-03 RX ORDER — CALCIUM CARBONATE 500(1250)
1 TABLET WITH MEALS TABLET ORAL TWICE A DAY
Qty: 60 | Status: ON HOLD | COMMUNITY
Start: 2022-09-22

## 2023-04-03 RX ORDER — ASCORBIC ACID 1000 MG
1 TABLET TABLET ORAL ONCE A DAY
Qty: 30 | Status: ON HOLD | COMMUNITY
Start: 2022-09-22

## 2023-04-03 RX ORDER — LACTOSE-REDUCED FOOD/FIBER 0.07 G-1.5
250 ML LIQUID (ML) ORAL
Qty: 37500 MILLILITER | Refills: 5 | Status: ACTIVE | COMMUNITY
Start: 2023-03-30

## 2023-05-25 ENCOUNTER — OFFICE VISIT (OUTPATIENT)
Dept: URBAN - METROPOLITAN AREA CLINIC 37 | Facility: CLINIC | Age: 83
End: 2023-05-25
Payer: COMMERCIAL

## 2023-05-25 VITALS
HEART RATE: 94 BPM | DIASTOLIC BLOOD PRESSURE: 82 MMHG | HEIGHT: 65 IN | SYSTOLIC BLOOD PRESSURE: 124 MMHG | BODY MASS INDEX: 25.66 KG/M2 | WEIGHT: 154 LBS | OXYGEN SATURATION: 94 %

## 2023-05-25 DIAGNOSIS — K59.09 CHRONIC CONSTIPATION: ICD-10-CM

## 2023-05-25 DIAGNOSIS — K22.2 ESOPHAGEAL STRICTURE: ICD-10-CM

## 2023-05-25 DIAGNOSIS — E44.0 MODERATE PROTEIN-CALORIE MALNUTRITION: ICD-10-CM

## 2023-05-25 DIAGNOSIS — R13.19 ESOPHAGEAL DYSPHAGIA: ICD-10-CM

## 2023-05-25 DIAGNOSIS — C15.9 PRIMARY ESOPHAGEAL SQUAMOUS CELL CARCINOMA: ICD-10-CM

## 2023-05-25 PROCEDURE — 99214 OFFICE O/P EST MOD 30 MIN: CPT | Performed by: INTERNAL MEDICINE

## 2023-05-25 RX ORDER — LACTULOSE 10 G/15ML
30 ML SOLUTION ORAL TWICE DAILY
Qty: 1800 MILLILITER | Refills: 3 | OUTPATIENT

## 2023-05-25 RX ORDER — LACTOSE-REDUCED FOOD/FIBER 0.07 G-1.5
150 ML LIQUID (ML) ORAL
Qty: 150 | Refills: 6 | Status: DISCONTINUED | COMMUNITY
Start: 2023-03-06

## 2023-05-25 RX ORDER — ASCORBIC ACID 1000 MG
1 TABLET TABLET ORAL ONCE A DAY
Qty: 30 | Status: ON HOLD | COMMUNITY
Start: 2022-09-22

## 2023-05-25 RX ORDER — PREDNISONE 5 MG/1
1 TABLET TABLET ORAL ONCE A DAY
Status: DISCONTINUED | COMMUNITY

## 2023-05-25 RX ORDER — LEVOTHYROXINE SODIUM 75 UG/1
1 TABLET IN THE MORNING ON AN EMPTY STOMACH TABLET ORAL ONCE A DAY
Qty: 30 | Status: ACTIVE | COMMUNITY
Start: 2022-09-22

## 2023-05-25 RX ORDER — CALCIUM CARBONATE 500(1250)
1 TABLET WITH MEALS TABLET ORAL TWICE A DAY
Qty: 60 | Status: ON HOLD | COMMUNITY
Start: 2022-09-22

## 2023-05-25 RX ORDER — LACTULOSE 10 G/15ML
30 ML SOLUTION ORAL TWICE DAILY
Qty: 1800 MILLILITER | Refills: 3 | Status: ON HOLD | COMMUNITY

## 2023-05-25 RX ORDER — OMEPRAZOLE 40 MG/1
1 CAPSULE 30 MINUTES BEFORE MORNING MEAL CAPSULE, DELAYED RELEASE ORAL ONCE A DAY
Status: ACTIVE | COMMUNITY

## 2023-05-25 RX ORDER — ZOLPIDEM TARTRATE 5 MG/1
TAKE ONE TABLET BY MOUTH AT BEDTIME ON NIGHT OF SLEEP STUDY DURING ELECTRODE APPLICATION; CAN REPEAT A SECOND DOSE 40 MINUTES LATER TABLET, COATED ORAL
Qty: 30 UNSPECIFIED | Refills: 0 | Status: ON HOLD | COMMUNITY

## 2023-05-25 RX ORDER — CODEINE PHOSPHATE AND GUAIFENESIN 10; 100 MG/5ML; MG/5ML
TAKE 5 ML BY MOUTH EVERY FOUR TO SIX HOURS AS NEEDED SOLUTION ORAL
Qty: 160 UNSPECIFIED | Refills: 0 | Status: ACTIVE | COMMUNITY

## 2023-05-25 RX ORDER — ATORVASTATIN CALCIUM 10 MG/1
TAKE ONE TABLET BY MOUTH ONE TIME DAILY TABLET, FILM COATED ORAL
Qty: 90 UNSPECIFIED | Refills: 0 | Status: ON HOLD | COMMUNITY

## 2023-05-25 RX ORDER — NUT.TX.IMPAIRED RENAL FXN,SOY 0.09G-2/ML
250 ML LIQUID (ML) ORAL
Qty: 375000 ML | Refills: 5 | Status: ACTIVE | COMMUNITY
Start: 2023-04-04

## 2023-05-25 RX ORDER — SERTRALINE 50 MG/1
1 TABLET TABLET, FILM COATED ORAL ONCE A DAY
Qty: 30 | Status: ACTIVE | COMMUNITY
Start: 2022-09-22

## 2023-05-25 RX ORDER — LACTOSE-REDUCED FOOD/FIBER 0.07 G-1.5
250 ML LIQUID (ML) ORAL
Qty: 37500 MILLILITER | Refills: 5 | OUTPATIENT

## 2023-05-25 NOTE — HPI-CONSTIPATION
Patient is here for routine follow up due to constipation. Last OV was 2 months ago Patient was advised to take Lactulose 20 GM/30 Ml, 30 mL TID but stopped medication.  Current BM:  1 formed BM daily Patient denies blood in stool, recal bleeding, or melena

## 2023-06-30 ENCOUNTER — TELEPHONE ENCOUNTER (OUTPATIENT)
Dept: URBAN - METROPOLITAN AREA CLINIC 36 | Facility: CLINIC | Age: 83
End: 2023-06-30

## 2023-07-06 ENCOUNTER — DASHBOARD ENCOUNTERS (OUTPATIENT)
Age: 83
End: 2023-07-06

## 2023-07-06 ENCOUNTER — OFFICE VISIT (OUTPATIENT)
Dept: URBAN - METROPOLITAN AREA CLINIC 37 | Facility: CLINIC | Age: 83
End: 2023-07-06
Payer: COMMERCIAL

## 2023-07-06 VITALS
BODY MASS INDEX: 26.99 KG/M2 | SYSTOLIC BLOOD PRESSURE: 108 MMHG | HEIGHT: 65 IN | HEART RATE: 87 BPM | WEIGHT: 162 LBS | DIASTOLIC BLOOD PRESSURE: 75 MMHG | OXYGEN SATURATION: 96 %

## 2023-07-06 DIAGNOSIS — C15.9 PRIMARY ESOPHAGEAL SQUAMOUS CELL CARCINOMA: ICD-10-CM

## 2023-07-06 DIAGNOSIS — K22.2 ESOPHAGEAL STRICTURE: ICD-10-CM

## 2023-07-06 DIAGNOSIS — K94.23 PEG TUBE MALFUNCTION: ICD-10-CM

## 2023-07-06 DIAGNOSIS — T85.848D PAIN AROUND PERCUTANEOUS ENDOSCOPIC GASTROSTOMY (PEG) TUBE SITE, SUBSEQUENT ENCOUNTER: ICD-10-CM

## 2023-07-06 DIAGNOSIS — K59.09 CHRONIC CONSTIPATION: ICD-10-CM

## 2023-07-06 DIAGNOSIS — E44.0 MODERATE PROTEIN-CALORIE MALNUTRITION: ICD-10-CM

## 2023-07-06 DIAGNOSIS — R13.19 ESOPHAGEAL DYSPHAGIA: ICD-10-CM

## 2023-07-06 PROBLEM — 426032000: Status: ACTIVE | Noted: 2023-07-06

## 2023-07-06 PROBLEM — 279001004: Status: ACTIVE | Noted: 2023-07-06

## 2023-07-06 PROCEDURE — 99213 OFFICE O/P EST LOW 20 MIN: CPT | Performed by: INTERNAL MEDICINE

## 2023-07-06 RX ORDER — CODEINE PHOSPHATE AND GUAIFENESIN 10; 100 MG/5ML; MG/5ML
TAKE 5 ML BY MOUTH EVERY FOUR TO SIX HOURS AS NEEDED SOLUTION ORAL
Qty: 160 UNSPECIFIED | Refills: 0 | Status: ON HOLD | COMMUNITY

## 2023-07-06 RX ORDER — LACTULOSE 10 G/15ML
30 ML SOLUTION ORAL TWICE DAILY
Qty: 1800 MILLILITER | Refills: 3 | OUTPATIENT

## 2023-07-06 RX ORDER — ZOLPIDEM TARTRATE 5 MG/1
TAKE ONE TABLET BY MOUTH AT BEDTIME ON NIGHT OF SLEEP STUDY DURING ELECTRODE APPLICATION; CAN REPEAT A SECOND DOSE 40 MINUTES LATER TABLET, COATED ORAL
Qty: 30 UNSPECIFIED | Refills: 0 | Status: ON HOLD | COMMUNITY

## 2023-07-06 RX ORDER — LACTULOSE 10 G/15ML
30 ML SOLUTION ORAL TWICE DAILY
Qty: 1800 MILLILITER | Refills: 3 | Status: ON HOLD | COMMUNITY

## 2023-07-06 RX ORDER — LEVOTHYROXINE SODIUM 75 UG/1
1 TABLET IN THE MORNING ON AN EMPTY STOMACH TABLET ORAL ONCE A DAY
Qty: 30 | Status: ON HOLD | COMMUNITY
Start: 2022-09-22

## 2023-07-06 RX ORDER — LACTOSE-REDUCED FOOD/FIBER 0.07 G-1.5
250 ML LIQUID (ML) ORAL
Qty: 37500 MILLILITER | Refills: 5 | OUTPATIENT

## 2023-07-06 RX ORDER — CALCIUM CARBONATE 500(1250)
1 TABLET WITH MEALS TABLET ORAL TWICE A DAY
Qty: 60 | Status: ON HOLD | COMMUNITY
Start: 2022-09-22

## 2023-07-06 RX ORDER — ATORVASTATIN CALCIUM 10 MG/1
TAKE ONE TABLET BY MOUTH ONE TIME DAILY TABLET, FILM COATED ORAL
Qty: 90 UNSPECIFIED | Refills: 0 | Status: ON HOLD | COMMUNITY

## 2023-07-06 RX ORDER — ASCORBIC ACID 1000 MG
1 TABLET TABLET ORAL ONCE A DAY
Qty: 30 | Status: ON HOLD | COMMUNITY
Start: 2022-09-22

## 2023-07-06 RX ORDER — OMEPRAZOLE 40 MG/1
1 CAPSULE 30 MINUTES BEFORE MORNING MEAL CAPSULE, DELAYED RELEASE ORAL ONCE A DAY
Status: ACTIVE | COMMUNITY

## 2023-07-06 RX ORDER — LACTOSE-REDUCED FOOD/FIBER 0.07 G-1.5
250 ML LIQUID (ML) ORAL
Qty: 37500 MILLILITER | Refills: 5 | Status: ACTIVE | COMMUNITY

## 2023-07-06 RX ORDER — NUT.TX.IMPAIRED RENAL FXN,SOY 0.09G-2/ML
250 ML LIQUID (ML) ORAL
Qty: 375000 ML | Refills: 5 | Status: ON HOLD | COMMUNITY
Start: 2023-04-04

## 2023-07-06 RX ORDER — SERTRALINE 50 MG/1
1 TABLET TABLET, FILM COATED ORAL ONCE A DAY
Qty: 30 | Status: ACTIVE | COMMUNITY
Start: 2022-09-22

## 2023-07-06 NOTE — HPI-CONSTIPATION
Patient is here for routine follow up due to constipation. Last OV was 1 month ago Patient was advised to take Lactulose 20 GM/30 Ml, 30 mL TID but stopped medication.  Current BM:  1 formed BM daily while taking Isosource. Patient denies blood in stool, recal bleeding, or melena

## 2023-07-06 NOTE — PHYSICAL EXAM GASTROINTESTINAL
Abdomen , soft, nontender, nondistended , no guarding or rigidity , no masses palpable , normal bowel sounds. PEG site with minimal yellowish mucus and dried blood.  External stopper is at 2.5 cm.  No infection Liver and Spleen , no hepatomegaly present , no hepatosplenomegaly , liver nontender , spleen not palpable